# Patient Record
Sex: FEMALE | Race: WHITE | NOT HISPANIC OR LATINO | Employment: UNEMPLOYED | URBAN - METROPOLITAN AREA
[De-identification: names, ages, dates, MRNs, and addresses within clinical notes are randomized per-mention and may not be internally consistent; named-entity substitution may affect disease eponyms.]

---

## 2018-07-02 ENCOUNTER — TELEPHONE (OUTPATIENT)
Dept: FAMILY MEDICINE | Facility: CLINIC | Age: 38
End: 2018-07-02

## 2018-07-02 NOTE — TELEPHONE ENCOUNTER
----- Message from Evelyne Chavez MA sent at 7/2/2018  3:20 PM CDT -----  Contact: [phone] self      ----- Message -----  From: Alexa Ventura  Sent: 7/2/2018   2:27 PM  To: Ryann Bazan Staff    Patient would like to est care (self pay) with the doctor. Please call and advise.